# Patient Record
Sex: FEMALE | HISPANIC OR LATINO | Employment: UNEMPLOYED | URBAN - METROPOLITAN AREA
[De-identification: names, ages, dates, MRNs, and addresses within clinical notes are randomized per-mention and may not be internally consistent; named-entity substitution may affect disease eponyms.]

---

## 2018-01-24 ENCOUNTER — NON-PROVIDER VISIT (OUTPATIENT)
Dept: OCCUPATIONAL MEDICINE | Facility: CLINIC | Age: 53
End: 2018-01-24

## 2018-01-24 DIAGNOSIS — Z11.1 PPD SCREENING TEST: ICD-10-CM

## 2018-01-24 PROCEDURE — 86580 TB INTRADERMAL TEST: CPT | Performed by: PREVENTIVE MEDICINE

## 2018-01-26 ENCOUNTER — APPOINTMENT (OUTPATIENT)
Dept: RADIOLOGY | Facility: IMAGING CENTER | Age: 53
End: 2018-01-26
Attending: PREVENTIVE MEDICINE

## 2018-01-26 ENCOUNTER — EMPLOYEE HEALTH (OUTPATIENT)
Dept: OCCUPATIONAL MEDICINE | Facility: CLINIC | Age: 53
End: 2018-01-26

## 2018-01-26 ENCOUNTER — NON-PROVIDER VISIT (OUTPATIENT)
Dept: OCCUPATIONAL MEDICINE | Facility: CLINIC | Age: 53
End: 2018-01-26

## 2018-01-26 DIAGNOSIS — Z11.1 PPD SCREENING TEST: ICD-10-CM

## 2018-01-26 LAB — TB WHEAL 3D P 5 TU DIAM: NORMAL MM

## 2018-01-26 PROCEDURE — 8916 PR CLEARANCE ONLY: Performed by: PREVENTIVE MEDICINE

## 2018-01-26 PROCEDURE — 71045 X-RAY EXAM CHEST 1 VIEW: CPT | Mod: TC | Performed by: PREVENTIVE MEDICINE

## 2020-12-02 ENCOUNTER — HOSPITAL ENCOUNTER (OUTPATIENT)
Dept: LAB | Facility: MEDICAL CENTER | Age: 55
End: 2020-12-02
Attending: ANESTHESIOLOGY
Payer: COMMERCIAL

## 2020-12-02 PROCEDURE — U0003 INFECTIOUS AGENT DETECTION BY NUCLEIC ACID (DNA OR RNA); SEVERE ACUTE RESPIRATORY SYNDROME CORONAVIRUS 2 (SARS-COV-2) (CORONAVIRUS DISEASE [COVID-19]), AMPLIFIED PROBE TECHNIQUE, MAKING USE OF HIGH THROUGHPUT TECHNOLOGIES AS DESCRIBED BY CMS-2020-01-R: HCPCS

## 2020-12-02 PROCEDURE — C9803 HOPD COVID-19 SPEC COLLECT: HCPCS

## 2020-12-03 LAB — COVID ORDER STATUS COVID19: NORMAL

## 2020-12-04 LAB
SARS-COV-2 RNA RESP QL NAA+PROBE: NOTDETECTED
SPECIMEN SOURCE: NORMAL

## 2022-07-22 ENCOUNTER — PATIENT OUTREACH (OUTPATIENT)
Dept: SCHEDULING | Facility: IMAGING CENTER | Age: 57
End: 2022-07-22

## 2022-07-22 ENCOUNTER — APPOINTMENT (OUTPATIENT)
Dept: RADIOLOGY | Facility: MEDICAL CENTER | Age: 57
End: 2022-07-22
Attending: EMERGENCY MEDICINE

## 2022-07-22 ENCOUNTER — HOSPITAL ENCOUNTER (EMERGENCY)
Facility: MEDICAL CENTER | Age: 57
End: 2022-07-22
Attending: EMERGENCY MEDICINE

## 2022-07-22 VITALS
HEIGHT: 63 IN | OXYGEN SATURATION: 98 % | BODY MASS INDEX: 29.77 KG/M2 | DIASTOLIC BLOOD PRESSURE: 64 MMHG | SYSTOLIC BLOOD PRESSURE: 127 MMHG | RESPIRATION RATE: 18 BRPM | WEIGHT: 167.99 LBS | TEMPERATURE: 98 F | HEART RATE: 72 BPM

## 2022-07-22 DIAGNOSIS — N20.0 KIDNEY STONE: ICD-10-CM

## 2022-07-22 LAB
ALBUMIN SERPL BCP-MCNC: 4.5 G/DL (ref 3.2–4.9)
ALBUMIN/GLOB SERPL: 1.4 G/DL
ALP SERPL-CCNC: 110 U/L (ref 30–99)
ALT SERPL-CCNC: 54 U/L (ref 2–50)
ANION GAP SERPL CALC-SCNC: 13 MMOL/L (ref 7–16)
APPEARANCE UR: CLEAR
AST SERPL-CCNC: 54 U/L (ref 12–45)
BACTERIA #/AREA URNS HPF: NEGATIVE /HPF
BASOPHILS # BLD AUTO: 0.4 % (ref 0–1.8)
BASOPHILS # BLD: 0.04 K/UL (ref 0–0.12)
BILIRUB SERPL-MCNC: 0.5 MG/DL (ref 0.1–1.5)
BILIRUB UR QL STRIP.AUTO: NEGATIVE
BUN SERPL-MCNC: 10 MG/DL (ref 8–22)
CALCIUM SERPL-MCNC: 9.8 MG/DL (ref 8.5–10.5)
CHLORIDE SERPL-SCNC: 106 MMOL/L (ref 96–112)
CO2 SERPL-SCNC: 22 MMOL/L (ref 20–33)
COLOR UR: YELLOW
CREAT SERPL-MCNC: 0.64 MG/DL (ref 0.5–1.4)
EOSINOPHIL # BLD AUTO: 0.16 K/UL (ref 0–0.51)
EOSINOPHIL NFR BLD: 1.5 % (ref 0–6.9)
EPI CELLS #/AREA URNS HPF: ABNORMAL /HPF
ERYTHROCYTE [DISTWIDTH] IN BLOOD BY AUTOMATED COUNT: 39.1 FL (ref 35.9–50)
GFR SERPLBLD CREATININE-BSD FMLA CKD-EPI: 102 ML/MIN/1.73 M 2
GLOBULIN SER CALC-MCNC: 3.2 G/DL (ref 1.9–3.5)
GLUCOSE SERPL-MCNC: 140 MG/DL (ref 65–99)
GLUCOSE UR STRIP.AUTO-MCNC: NEGATIVE MG/DL
HCG UR QL: NEGATIVE
HCT VFR BLD AUTO: 44.2 % (ref 37–47)
HGB BLD-MCNC: 14.9 G/DL (ref 12–16)
IMM GRANULOCYTES # BLD AUTO: 0.03 K/UL (ref 0–0.11)
IMM GRANULOCYTES NFR BLD AUTO: 0.3 % (ref 0–0.9)
KETONES UR STRIP.AUTO-MCNC: ABNORMAL MG/DL
LEUKOCYTE ESTERASE UR QL STRIP.AUTO: ABNORMAL
LIPASE SERPL-CCNC: 45 U/L (ref 11–82)
LYMPHOCYTES # BLD AUTO: 3.45 K/UL (ref 1–4.8)
LYMPHOCYTES NFR BLD: 32.7 % (ref 22–41)
MCH RBC QN AUTO: 27.5 PG (ref 27–33)
MCHC RBC AUTO-ENTMCNC: 33.7 G/DL (ref 33.6–35)
MCV RBC AUTO: 81.7 FL (ref 81.4–97.8)
MICRO URNS: ABNORMAL
MONOCYTES # BLD AUTO: 0.7 K/UL (ref 0–0.85)
MONOCYTES NFR BLD AUTO: 6.6 % (ref 0–13.4)
NEUTROPHILS # BLD AUTO: 6.18 K/UL (ref 2–7.15)
NEUTROPHILS NFR BLD: 58.5 % (ref 44–72)
NITRITE UR QL STRIP.AUTO: NEGATIVE
NRBC # BLD AUTO: 0 K/UL
NRBC BLD-RTO: 0 /100 WBC
PH UR STRIP.AUTO: 7.5 [PH] (ref 5–8)
PLATELET # BLD AUTO: 306 K/UL (ref 164–446)
PMV BLD AUTO: 9.2 FL (ref 9–12.9)
POTASSIUM SERPL-SCNC: 4.4 MMOL/L (ref 3.6–5.5)
PROT SERPL-MCNC: 7.7 G/DL (ref 6–8.2)
PROT UR QL STRIP: 30 MG/DL
RBC # BLD AUTO: 5.41 M/UL (ref 4.2–5.4)
RBC # URNS HPF: ABNORMAL /HPF
RBC UR QL AUTO: ABNORMAL
SODIUM SERPL-SCNC: 141 MMOL/L (ref 135–145)
SP GR UR STRIP.AUTO: 1.03
UROBILINOGEN UR STRIP.AUTO-MCNC: 1 MG/DL
WBC # BLD AUTO: 10.6 K/UL (ref 4.8–10.8)
WBC #/AREA URNS HPF: ABNORMAL /HPF

## 2022-07-22 PROCEDURE — 74176 CT ABD & PELVIS W/O CONTRAST: CPT

## 2022-07-22 PROCEDURE — 96374 THER/PROPH/DIAG INJ IV PUSH: CPT

## 2022-07-22 PROCEDURE — 80053 COMPREHEN METABOLIC PANEL: CPT

## 2022-07-22 PROCEDURE — 700102 HCHG RX REV CODE 250 W/ 637 OVERRIDE(OP): Performed by: EMERGENCY MEDICINE

## 2022-07-22 PROCEDURE — 85025 COMPLETE CBC W/AUTO DIFF WBC: CPT

## 2022-07-22 PROCEDURE — 81001 URINALYSIS AUTO W/SCOPE: CPT

## 2022-07-22 PROCEDURE — 99284 EMERGENCY DEPT VISIT MOD MDM: CPT

## 2022-07-22 PROCEDURE — A9270 NON-COVERED ITEM OR SERVICE: HCPCS | Performed by: EMERGENCY MEDICINE

## 2022-07-22 PROCEDURE — 700111 HCHG RX REV CODE 636 W/ 250 OVERRIDE (IP): Performed by: EMERGENCY MEDICINE

## 2022-07-22 PROCEDURE — 83690 ASSAY OF LIPASE: CPT

## 2022-07-22 PROCEDURE — 81025 URINE PREGNANCY TEST: CPT

## 2022-07-22 PROCEDURE — 36415 COLL VENOUS BLD VENIPUNCTURE: CPT

## 2022-07-22 RX ORDER — ACETAMINOPHEN 325 MG/1
650 TABLET ORAL ONCE
Status: COMPLETED | OUTPATIENT
Start: 2022-07-22 | End: 2022-07-22

## 2022-07-22 RX ORDER — KETOROLAC TROMETHAMINE 30 MG/ML
15 INJECTION, SOLUTION INTRAMUSCULAR; INTRAVENOUS ONCE
Status: COMPLETED | OUTPATIENT
Start: 2022-07-22 | End: 2022-07-22

## 2022-07-22 RX ORDER — OXYCODONE HYDROCHLORIDE AND ACETAMINOPHEN 5; 325 MG/1; MG/1
1 TABLET ORAL ONCE
Status: COMPLETED | OUTPATIENT
Start: 2022-07-22 | End: 2022-07-22

## 2022-07-22 RX ADMIN — OXYCODONE HYDROCHLORIDE AND ACETAMINOPHEN 1 TABLET: 5; 325 TABLET ORAL at 05:27

## 2022-07-22 RX ADMIN — KETOROLAC TROMETHAMINE 15 MG: 30 INJECTION, SOLUTION INTRAMUSCULAR at 05:10

## 2022-07-22 RX ADMIN — ACETAMINOPHEN 650 MG: 325 TABLET, FILM COATED ORAL at 05:09

## 2022-07-22 ASSESSMENT — PAIN DESCRIPTION - DESCRIPTORS: DESCRIPTORS: STABBING

## 2022-07-22 NOTE — ED NOTES
Pt is discharged. Paperwork explained and all questions answered. All belongings sent with pt upon departure. Pt ambulatory with a steady gait out of ED.

## 2022-07-22 NOTE — DISCHARGE INSTRUCTIONS
You were seen in the ED for flank pain. Your labs and imaging suggest you have a kidney stone that has passed..  Your pain improved with the medication we gave you.  And should continue to improve.    You can take ibuprofen, 600mg every 6 hours as needed for pain (take with food to avoid GI upset). You can also take Tylenol (acetaminophen), 1000mg every 8 hours as needed for pain (do not take more than 3000mg of acetaminophen in 24 hours). Taking these medications regularly can be very effective in controlling pain.       Please stay well hydrated, drink at least 3-4 liters of water daily.  Please follow-up with urology.     Please return to the ED if you develop inability to pass urine, fevers, ongoing vomiting, or new or different pain.

## 2022-07-22 NOTE — ED PROVIDER NOTES
ED Provider Note    Scribed for Kaleb Conn M.D. by Thu Rankin. 7/22/2022,  4:11 AM.    Means of Arrival: Walk-in  History obtained from: Patient   History limited by: None     CHIEF COMPLAINT  Chief Complaint   Patient presents with   • Flank Pain     C/o right flank pain started 11 pm. Pain radiates to RLQ. Denies N/V.        HPI  Mt Haro is a 57 y.o. female who presents to the Emergency Department for evaluation of sudden right sided flank pain onset 11PM prior to arrival. Patient describes her pain quality as sharp. Patient states that it feels like she is giving birth. She states that her pain radiates to her right lower quadrant. Patient reports history of diabetes, thyroid problems, and cholesterol issues. She notes that she also has a family history of kidney stones, but she has never had one before. She reports that laying down exacerbates her symptoms. She admits to associated symptoms of diffuse abdominal pain, but denies vomiting, fevers, hematuria, or diarrhea. No alleviating factors were reported. She denies any allergies.     REVIEW OF SYSTEMS  CONSTITUTIONAL:  No fever.  CARDIOVASCULAR:  No chest discomfort.  RESPIRATORY:  No pleuritic chest pain.  GASTROINTESTINAL:    GENITOURINARY:   No dysuria.  MUSCULOSKELETAL:  No arthralgia.  SKIN:  No rash or suspicious lesions.  NEUROLOGIC:   No headache.  See HPI for further details.   All other systems are negative.     PAST MEDICAL HISTORY  Past Medical History:   Diagnosis Date   • Diabetes mellitus (HCC)     type 2   • High cholesterol        FAMILY HISTORY  History reviewed. No pertinent family history.    SOCIAL HISTORY   reports that she has never smoked. She has never used smokeless tobacco. She reports that she does not drink alcohol and does not use drugs.    SURGICAL HISTORY  History reviewed. No pertinent surgical history.    CURRENT MEDICATIONS  Home Medications     Reviewed by Jj Montano R.N. (Registered Nurse) on 07/22/22 at  "0316  Med List Status: Partial   Medication Last Dose Status        Patient Ric Taking any Medications                       ALLERGIES  No Known Allergies    PHYSICAL EXAM  VITAL SIGNS: BP (!) 160/97   Pulse 69   Temp 36.6 °C (97.9 °F) (Temporal)   Resp 20   Ht 1.6 m (5' 3\")   Wt 76.2 kg (167 lb 15.9 oz)   SpO2 99%   BMI 29.76 kg/m²    Gen: Alert, uncomfortable appearing  HEENT: ATNC  Eyes: PERRL, EOMI, normal conjunctiva.   Neck: trachea midline  Resp: no respiratory distress  CV: No JVD  Abd: non-distended, soft, nontender  : Mild right CVA tenderness  Ext: No deformities  Psych: normal mood  Neuro: speech fluent     DIAGNOSTIC STUDIES / PROCEDURES     LABS  Labs Reviewed   CBC WITH DIFFERENTIAL - Abnormal; Notable for the following components:       Result Value    RBC 5.41 (*)     All other components within normal limits   COMP METABOLIC PANEL - Abnormal; Notable for the following components:    Glucose 140 (*)     AST(SGOT) 54 (*)     ALT(SGPT) 54 (*)     Alkaline Phosphatase 110 (*)     All other components within normal limits   URINALYSIS - Abnormal; Notable for the following components:    Ketones Trace (*)     Protein 30 (*)     Leukocyte Esterase Trace (*)     Occult Blood Moderate (*)     All other components within normal limits   URINE MICROSCOPIC (W/UA) - Abnormal; Notable for the following components:    RBC 20-50 (*)     All other components within normal limits   LIPASE   HCG QUALITATIVE UR   ESTIMATED GFR     All labs reviewed by me.    RADIOLOGY  CT-RENAL COLIC EVALUATION(A/P W/O)   Final Result         1.  Mild right hydronephrosis and hydroureter without visualized obstructing stone, there is tiny stone dependently within the bladder likely representing recently passed right ureteral stone.   2.  Diverticulosis        The radiologist’s interpretation of all radiology studies have been reviewed by me.    COURSE & MEDICAL DECISION MAKING  Pertinent Labs & Imaging studies reviewed. " (See chart for details)    4:11 AM Patient seen and examined at bedside. Patient will be treated with Toradol 15mg injection and tylenol 650mg tablet for her symptoms. Discussed plan of care with patient. I informed them that labs and imaging will be ordered to evaluate symptoms. Patient is understanding and agreeable with plan.     Medical Decision Making:  Patient presents with sudden onset right flank pain that radiates around to the abdomen.  It does not go down the leg to suggest cauda equina syndrome or sciatica.  She has a benign abdominal examination.  She denies any history of kidney stones, will order CTU given this the most likely diagnosis.  Will order urinalysis to evaluate for possible infected stone, urinary tract infection.  The patient has not taking anything for pain medications, will provide ketorolac and acetaminophen for initial pain relief.    CTA demonstrates passed kidney stone.  No evidence of SANDRITA or infection.  Urinalysis consistent with kidney stone.  Patient treated with pain medications, advised that her pain should improve.    The patient was given return precautions, anticipatory guidance, and the opportunity to ask questions prior to discharge.         DISPOSITION:  Patient will be discharged home in stable condition.    FOLLOW UP:  Your regular doctor.  To establish a primary care provider within our system, please call [phone]          Kip Rojas M.D.  [address] [facility] [address] [phone]    Schedule an appointment as soon as possible for a visit   As needed    [facility], Emergency Dept  [address] [facility] [place] [address] [phone]    If symptoms worsen      FINAL IMPRESSION  1. Kidney stone            Thu HUSTON (Scrpaul), am scribing for, and in the presence of, Kaleb Conn M.D..    Electronically signed by: Thu Rankin (Kofi), 7/22/2022    Kaleb HUSTON M.D. personally performed the services described in  this documentation, as scribed by Thu Rankin in my presence, and it is both accurate and complete. C.     The note accurately reflects work and decisions made by me.  Kaleb Conn M.D.  7/22/2022  4:39 AM      This dictation was created using voice recognition software. The accuracy of the dictation is limited to the abilities of the software. I expect there may be some errors of grammar and possibly content. The nursing notes were reviewed and certain aspects of this information were incorporated into this note.

## 2022-07-22 NOTE — ED TRIAGE NOTES
Mt Haro  57 y.o.  female  Chief Complaint   Patient presents with   • Flank Pain     C/o right flank pain started 11 pm. Pain radiates to RLQ. Denies N/V.

## 2022-09-16 ENCOUNTER — HOSPITAL ENCOUNTER (OUTPATIENT)
Dept: RADIOLOGY | Facility: MEDICAL CENTER | Age: 57
End: 2022-09-16
Attending: STUDENT IN AN ORGANIZED HEALTH CARE EDUCATION/TRAINING PROGRAM

## 2022-09-16 DIAGNOSIS — R10.9 RIGHT FLANK PAIN: ICD-10-CM

## 2023-09-28 ENCOUNTER — HOSPITAL ENCOUNTER (EMERGENCY)
Facility: MEDICAL CENTER | Age: 58
End: 2023-09-28
Attending: EMERGENCY MEDICINE
Payer: COMMERCIAL

## 2023-09-28 VITALS
TEMPERATURE: 98.3 F | DIASTOLIC BLOOD PRESSURE: 81 MMHG | HEIGHT: 62 IN | RESPIRATION RATE: 21 BRPM | BODY MASS INDEX: 29.01 KG/M2 | HEART RATE: 79 BPM | WEIGHT: 157.63 LBS | OXYGEN SATURATION: 99 % | SYSTOLIC BLOOD PRESSURE: 166 MMHG

## 2023-09-28 DIAGNOSIS — L72.3 INFECTED SEBACEOUS CYST OF SKIN: ICD-10-CM

## 2023-09-28 DIAGNOSIS — L08.9 INFECTED SEBACEOUS CYST OF SKIN: ICD-10-CM

## 2023-09-28 PROCEDURE — 99282 EMERGENCY DEPT VISIT SF MDM: CPT

## 2023-09-28 PROCEDURE — 303977 HCHG I & D

## 2023-09-28 PROCEDURE — 700111 HCHG RX REV CODE 636 W/ 250 OVERRIDE (IP): Performed by: EMERGENCY MEDICINE

## 2023-09-28 RX ORDER — SULFAMETHOXAZOLE AND TRIMETHOPRIM 800; 160 MG/1; MG/1
1 TABLET ORAL 2 TIMES DAILY
Qty: 14 TABLET | Refills: 0 | Status: ACTIVE | OUTPATIENT
Start: 2023-09-28 | End: 2023-10-05

## 2023-09-28 RX ADMIN — LIDOCAINE HYDROCHLORIDE 5 ML: 10 INJECTION, SOLUTION EPIDURAL; INFILTRATION; INTRACAUDAL at 16:40

## 2023-09-28 ASSESSMENT — FIBROSIS 4 INDEX: FIB4 SCORE: 1.42

## 2023-09-28 NOTE — ED NOTES
Patient escorted from waiting lobby to assigned room. Patient escorted by ambulating to room with steady gait. Patient given call button for assistance. Chart up for ERP.

## 2023-09-28 NOTE — ED PROVIDER NOTES
"ED Provider Note    Primary care provider: Pcp Pt States None    CHIEF COMPLAINT  Chief Complaint   Patient presents with    Wound Check     Pt reports that she has a reoccurring abscess to left axilla.         HPI  Mt Haro is a 58 y.o. female who presents to the Emergency Department a slowly enlarging wound from the left axillary region.  The patient states she had one removed a few years ago but it came back in the exact same location.  Denies any fevers or chills, has not had any drainage.      External Record Review: Patient was in the [place] emergency department 2021 for left axillary abscess.    REVIEW OF SYSTEMS  See HPI.     PAST MEDICAL HISTORY   has a past medical history of Diabetes mellitus (HCC) and High cholesterol.    SURGICAL HISTORY  patient denies any surgical history    SOCIAL HISTORY  Social History     Tobacco Use    Smoking status: Never    Smokeless tobacco: Never   Vaping Use    Vaping Use: Never used   Substance Use Topics    Alcohol use: Never    Drug use: Never      Social History     Substance and Sexual Activity   Drug Use Never       FAMILY HISTORY  No family history on file.    CURRENT MEDICATIONS  Reviewed.  See Encounter Summary.     ALLERGIES  No Known Allergies    PHYSICAL EXAM  VITAL SIGNS: BP (!) 160/88   Pulse 61   Temp 36.8 °C (98.3 °F) (Temporal)   Resp 16   Ht 1.575 m (5' 2\")   Wt 71.5 kg (157 lb 10.1 oz)   SpO2 98%   BMI 28.83 kg/m²   Constitutional: Awake, alert in no apparent distress.  HENT: Normocephalic, Bilateral external ears normal. Nose normal.   Eyes: Conjunctiva normal, non-icteric, EOMI.    Thorax & Lungs: Easy unlabored respirations, Clear to ascultation bilaterally.  Cardiovascular: Regular rate, Regular rhythm, No murmurs, rubs or gallops. Bilateral pulses symmetrical.   Abdomen:  Soft, nontender, nondistended, normal active bowel sounds.   :    Skin: In the left axilla there is a 1 cm x 2 cm raised area of induration consistent with an " infected epidermal inclusion cyst.  Musculoskeletal:   No cyanosis, clubbing or edema. No leg asymmetry.   Neurologic: Alert, Grossly non-focal.   Psychiatric: Normal affect, Normal mood  Lymphatic:        Procedure note:  The area of induration was anesthetized using 1% lidocaine without epinephrine, following this I used an 11 blade, made a longitudinal incision, the drainage was consistent with purulence and caseous material, the caseous material was removed with forceps and direct pressure.  The wound was left open and dressed with a sterile gauze, this was tolerated well without any immediate complications.      COURSE & MEDICAL DECISION MAKING  Pertinent Labs & Imaging studies reviewed. (See chart for details)    COURSE & MEDICAL DECISION MAKING  Pertinent Labs & Imaging studies reviewed. (See chart for details)    Differential diagnoses include but are not limited to: Infected EIC    4:06 PM - Nursing notes reviewed, patient seen and examined at bedside.       Escalation of care considered, and ultimately not performed: blood analysis.    Barriers to care at this time, including but not limited to: Patient does not have established PCP.     Decision Making:  This is a pleasant 58 y.o. year old female who presents with what appears to be an infected epidermal inclusion cyst.  The cyst was drained as above, patient will be placed on antibiotics.  I explained that this will likely recur and like she gets complete excision of the area, typically done by a surgeon.  I will place a referral for a general surgeon, no emergent need for consultation, she can follow-up and had this removed in the next few months.       The patient was discharged home (see d/c instructions) was told to return immediately for any signs or symptoms listed, or any worsening at all.  The patient verbally agreed to the discharge precautions and follow-up plan which is documented in EPIC.    Discharge Medications:  New Prescriptions     SULFAMETHOXAZOLE-TRIMETHOPRIM (BACTRIM DS) 800-160 MG TABLET    Take 1 Tablet by mouth 2 times a day for 7 days.       FINAL IMPRESSION  1. Infected sebaceous cyst of skin

## 2023-09-28 NOTE — ED TRIAGE NOTES
"Chief Complaint   Patient presents with    Wound Check     Pt reports that she has a reoccurring abscess to left axilla.     BP (!) 160/88   Pulse 61   Temp 36.8 °C (98.3 °F) (Temporal)   Resp 16   Ht 1.575 m (5' 2\")   Wt 71.5 kg (157 lb 10.1 oz)   SpO2 98%   BMI 28.83 kg/m²     "

## 2023-09-29 NOTE — ED NOTES
Pt abscess/wound site on L axilla/ underarm from I&D, cleaned up, dressing applied per ERP. Adaptic w/ 4x4 gauze and taped to secure. Pt provided supplies for home w/ instructions on changing bandage and keeping site clean. Pt verbalizes understanding    Pt signed and given d/c papers. Discussed x1 rx sent to pref pharmacy and f/u-info highlighted. Discussed importance of taking and finishing full course of abx- pt verbalizes understanding. Pt denies further questions/concerns. Pt ambulated out of the dept w/ steady gait A&O x4 w/ all belongings

## 2023-12-06 ENCOUNTER — OFFICE VISIT (OUTPATIENT)
Dept: URGENT CARE | Facility: CLINIC | Age: 58
End: 2023-12-06
Payer: COMMERCIAL

## 2023-12-06 VITALS
BODY MASS INDEX: 30.07 KG/M2 | OXYGEN SATURATION: 95 % | HEART RATE: 78 BPM | HEIGHT: 62 IN | RESPIRATION RATE: 16 BRPM | SYSTOLIC BLOOD PRESSURE: 120 MMHG | TEMPERATURE: 97.7 F | DIASTOLIC BLOOD PRESSURE: 88 MMHG | WEIGHT: 163.4 LBS

## 2023-12-06 DIAGNOSIS — J01.90 ACUTE NON-RECURRENT SINUSITIS, UNSPECIFIED LOCATION: ICD-10-CM

## 2023-12-06 DIAGNOSIS — R05.1 ACUTE COUGH: ICD-10-CM

## 2023-12-06 PROCEDURE — 3074F SYST BP LT 130 MM HG: CPT | Performed by: PHYSICIAN ASSISTANT

## 2023-12-06 PROCEDURE — 3079F DIAST BP 80-89 MM HG: CPT | Performed by: PHYSICIAN ASSISTANT

## 2023-12-06 PROCEDURE — 99204 OFFICE O/P NEW MOD 45 MIN: CPT | Performed by: PHYSICIAN ASSISTANT

## 2023-12-06 RX ORDER — IBUPROFEN 600 MG/1
600 TABLET ORAL EVERY 6 HOURS PRN
COMMUNITY

## 2023-12-06 RX ORDER — DEXTROMETHORPHAN HYDROBROMIDE AND PROMETHAZINE HYDROCHLORIDE 15; 6.25 MG/5ML; MG/5ML
5 SYRUP ORAL 4 TIMES DAILY PRN
Qty: 118 ML | Refills: 0 | Status: SHIPPED | OUTPATIENT
Start: 2023-12-06 | End: 2024-03-06

## 2023-12-06 RX ORDER — FLUTICASONE PROPIONATE 50 MCG
1 SPRAY, SUSPENSION (ML) NASAL DAILY
Qty: 16 G | Refills: 0 | Status: SHIPPED | OUTPATIENT
Start: 2023-12-06 | End: 2024-03-06

## 2023-12-06 RX ORDER — AMOXICILLIN AND CLAVULANATE POTASSIUM 875; 125 MG/1; MG/1
1 TABLET, FILM COATED ORAL 2 TIMES DAILY
Qty: 14 TABLET | Refills: 0 | Status: SHIPPED | OUTPATIENT
Start: 2023-12-06 | End: 2023-12-13

## 2023-12-06 ASSESSMENT — ENCOUNTER SYMPTOMS
HEMOPTYSIS: 0
COUGH: 1
SHORTNESS OF BREATH: 0
HEADACHES: 1
CHILLS: 0
SORE THROAT: 1
RHINORRHEA: 1

## 2023-12-06 ASSESSMENT — FIBROSIS 4 INDEX: FIB4 SCORE: 1.42

## 2023-12-07 NOTE — PROGRESS NOTES
Subjective:   Mt Haro is a 58 y.o. female who presents for Cough (Persistent cough, chills, headache, body aches x 8 days)        Cough  This is a new problem. The current episode started 1 to 4 weeks ago. The problem has been gradually worsening. The problem occurs constantly. The cough is Productive of purulent sputum. Associated symptoms include headaches, nasal congestion (severe with sinus pressure. yellow nasal d/c.), rhinorrhea and a sore throat. Pertinent negatives include no chest pain, chills, ear pain, hemoptysis or shortness of breath. Treatments tried: theraflu, tylenol, honey, ginger. The treatment provided no relief. There is no history of asthma, bronchitis or pneumonia.     Review of Systems   Constitutional:  Negative for chills.   HENT:  Positive for rhinorrhea and sore throat. Negative for ear pain.    Respiratory:  Positive for cough. Negative for hemoptysis and shortness of breath.    Cardiovascular:  Negative for chest pain.   Neurological:  Positive for headaches.       PMH:  has a past medical history of Diabetes mellitus (HCC) and High cholesterol.  MEDS:   Current Outpatient Medications:     ibuprofen (MOTRIN) 600 MG Tab, Take 600 mg by mouth every 6 hours as needed., Disp: , Rfl:     amoxicillin-clavulanate (AUGMENTIN) 875-125 MG Tab, Take 1 Tablet by mouth 2 times a day for 7 days., Disp: 14 Tablet, Rfl: 0    promethazine-dextromethorphan (PROMETHAZINE-DM) 6.25-15 MG/5ML syrup, Take 5 mL by mouth 4 times a day as needed for Cough., Disp: 118 mL, Rfl: 0    fluticasone (FLONASE) 50 MCG/ACT nasal spray, Administer 1 Spray into affected nostril(S) every day., Disp: 16 g, Rfl: 0  ALLERGIES: No Known Allergies  SURGHX: History reviewed. No pertinent surgical history.  SOCHX:  reports that she has never smoked. She has never used smokeless tobacco. She reports that she does not drink alcohol and does not use drugs.  FH: Family history was reviewed, no pertinent findings to report    "Objective:   /88 (BP Location: Right arm, Patient Position: Sitting, BP Cuff Size: Adult)   Pulse 78   Temp 36.5 °C (97.7 °F) (Temporal)   Resp 16   Ht 1.575 m (5' 2\")   Wt 74.1 kg (163 lb 6.4 oz)   SpO2 95%   BMI 29.89 kg/m²   Physical Exam  Vitals reviewed.   Constitutional:       General: She is not in acute distress.     Appearance: Normal appearance. She is well-developed. She is not toxic-appearing.   HENT:      Head: Normocephalic and atraumatic.      Right Ear: External ear normal.      Left Ear: External ear normal.      Nose: Congestion and rhinorrhea present. Rhinorrhea is purulent.      Mouth/Throat:      Lips: Pink.      Mouth: Mucous membranes are moist.      Pharynx: Oropharynx is clear. Uvula midline. Posterior oropharyngeal erythema present.   Cardiovascular:      Rate and Rhythm: Normal rate and regular rhythm.      Heart sounds: Normal heart sounds, S1 normal and S2 normal.   Pulmonary:      Effort: Pulmonary effort is normal. No respiratory distress.      Breath sounds: Normal breath sounds. No stridor. No decreased breath sounds, wheezing, rhonchi or rales.      Comments: Frequent dry cough  Lymphadenopathy:      Cervical:      Right cervical: No superficial or posterior cervical adenopathy.     Left cervical: No superficial or posterior cervical adenopathy.   Skin:     General: Skin is dry.   Neurological:      Comments: Alert and oriented.    Psychiatric:         Speech: Speech normal.         Behavior: Behavior normal.           Assessment/Plan:   1. Acute non-recurrent sinusitis, unspecified location  - amoxicillin-clavulanate (AUGMENTIN) 875-125 MG Tab; Take 1 Tablet by mouth 2 times a day for 7 days.  Dispense: 14 Tablet; Refill: 0  - promethazine-dextromethorphan (PROMETHAZINE-DM) 6.25-15 MG/5ML syrup; Take 5 mL by mouth 4 times a day as needed for Cough.  Dispense: 118 mL; Refill: 0    2. Acute cough  - fluticasone (FLONASE) 50 MCG/ACT nasal spray; Administer 1 Spray into " affected nostril(S) every day.  Dispense: 16 g; Refill: 0    Other orders  - ibuprofen (MOTRIN) 600 MG Tab; Take 600 mg by mouth every 6 hours as needed.    Considerations include but not limited to sinusitis, allergic rhinitis, viral URI, bronchitis, pneumonia.  History and exam today consistent with sinusitis.  Given duration of progression of symptoms I suspect that this is bacterial in nature.    Patient started on antibiotic therapy.  Recommend taking these with food to avoid GI upset.  Concurrent use of probiotic may also be beneficial.    Flonase 1 squirt in each nostril, as instructed in clinic, once a day.  Use nasal saline TID to promote drainage.   Salt water gurgles to soothe sore throat.  Ayr saline gel to moisturize nasal passage and prevent bleeding.  Try to use sudafed sparingly in order to allow sinuses to drain.  Avoid the longer formulations and try to take only in the morning and/or at noon if needed.  If you fail to improve in 3-5 days or symptoms worsen/new symptoms develop, RTC for reevaluation

## 2024-03-06 ENCOUNTER — OFFICE VISIT (OUTPATIENT)
Dept: INTERNAL MEDICINE | Facility: OTHER | Age: 59
End: 2024-03-06
Payer: COMMERCIAL

## 2024-03-06 VITALS
BODY MASS INDEX: 30.29 KG/M2 | HEIGHT: 62 IN | OXYGEN SATURATION: 96 % | DIASTOLIC BLOOD PRESSURE: 80 MMHG | SYSTOLIC BLOOD PRESSURE: 130 MMHG | HEART RATE: 65 BPM | WEIGHT: 164.6 LBS | TEMPERATURE: 98.2 F

## 2024-03-06 DIAGNOSIS — E11.9 TYPE 2 DIABETES MELLITUS WITHOUT COMPLICATION, WITHOUT LONG-TERM CURRENT USE OF INSULIN (HCC): ICD-10-CM

## 2024-03-06 DIAGNOSIS — Z85.118 H/O: LUNG CANCER: ICD-10-CM

## 2024-03-06 DIAGNOSIS — E78.5 DYSLIPIDEMIA: ICD-10-CM

## 2024-03-06 DIAGNOSIS — R20.2 ARM PARESTHESIA, LEFT: ICD-10-CM

## 2024-03-06 DIAGNOSIS — E66.9 CLASS 1 OBESITY WITHOUT SERIOUS COMORBIDITY WITH BODY MASS INDEX (BMI) OF 30.0 TO 30.9 IN ADULT, UNSPECIFIED OBESITY TYPE: ICD-10-CM

## 2024-03-06 DIAGNOSIS — E03.9 HYPOTHYROIDISM, UNSPECIFIED TYPE: ICD-10-CM

## 2024-03-06 PROCEDURE — 99214 OFFICE O/P EST MOD 30 MIN: CPT | Mod: GC

## 2024-03-06 PROCEDURE — 3079F DIAST BP 80-89 MM HG: CPT | Mod: GC

## 2024-03-06 PROCEDURE — 3075F SYST BP GE 130 - 139MM HG: CPT | Mod: GC

## 2024-03-06 RX ORDER — LEVOTHYROXINE SODIUM 0.03 MG/1
25 TABLET ORAL ONCE
COMMUNITY
End: 2024-03-06 | Stop reason: SDUPTHER

## 2024-03-06 RX ORDER — ATORVASTATIN CALCIUM 20 MG/1
20 TABLET, FILM COATED ORAL NIGHTLY
Qty: 30 TABLET | Refills: 1 | Status: SHIPPED | OUTPATIENT
Start: 2024-03-06

## 2024-03-06 RX ORDER — LEVOTHYROXINE SODIUM 0.03 MG/1
25 TABLET ORAL ONCE
Qty: 1 TABLET | Refills: 0 | Status: SHIPPED | OUTPATIENT
Start: 2024-03-06 | End: 2024-03-06

## 2024-03-06 RX ORDER — ATORVASTATIN CALCIUM 20 MG/1
20 TABLET, FILM COATED ORAL NIGHTLY
COMMUNITY
End: 2024-03-06 | Stop reason: SDUPTHER

## 2024-03-06 ASSESSMENT — ENCOUNTER SYMPTOMS
NECK PAIN: 0
WEIGHT LOSS: 0
TINGLING: 1
SHORTNESS OF BREATH: 0
HEARTBURN: 0
DOUBLE VISION: 0
BACK PAIN: 0
CONSTIPATION: 0
BLOOD IN STOOL: 0
SENSORY CHANGE: 0
BLURRED VISION: 0
TREMORS: 0
SPEECH CHANGE: 0
DIAPHORESIS: 0
PALPITATIONS: 0
FOCAL WEAKNESS: 0
FALLS: 0
WEAKNESS: 0
ABDOMINAL PAIN: 0
COUGH: 0
MYALGIAS: 0
DIARRHEA: 0

## 2024-03-06 ASSESSMENT — FIBROSIS 4 INDEX: FIB4 SCORE: 1.42

## 2024-03-06 ASSESSMENT — PATIENT HEALTH QUESTIONNAIRE - PHQ9: CLINICAL INTERPRETATION OF PHQ2 SCORE: 0

## 2024-03-06 NOTE — ASSESSMENT & PLAN NOTE
Onset: 2 months.   Numbness and tingling involving left, anterior forearm downwards and also affecting distally finger 1-3. Denies weakness, fever, time sensitive ...  No h/o autoimmune disease in her family, including SLE, RA or others.   Positive Phalen and Tinel on left extremity. Reproducible electric type pain in left forearm upon medial palpation of left elbow. None of this findings present in right hand, wrist, forearm but concern for development in the future.   Concern for Carpal Tunnel syndrome and possible ulnar nerve entrapment at the elbow.     Plan:  - Referral to [facility].   - Wrist brace, patient already has one at home, advised to use.

## 2024-03-06 NOTE — PROGRESS NOTES
OFFICE VISIT: INITIAL ENCOUNTER    Mt Haro is a 58 y.o. female with PMH of NIDDM type 2, Dyslipidemia, Hypothyroidism, prior Lung cancer (s/p resection in 2010 w/o chemo or radiation) who presents today with the following:    Chief Complaint:  Establish care.     History of Present Illness:   Bilateral wrist and elbow pain.  Patient refers that his problem presented itself approximately 2 months ago.  It affects more her left wrist and elbow.  She refers she has a sensation of having her wrist and fingers swollen throughout the day as well as numbness/tingling and electric type pain from her elbow downwards on the left side that it stops at the wrist and the same numbness/tingling sensation involving the distal aspects of fingers 1 through 3.  On the right side she does not have this paresthesias.  She denies prior history of RA, SLE in her or any family member, or any other specific autoimmune diseases run in her family in this regard.  She denies any traumas to the affected areas; she works in house keeping at the [employer] which could potentially be the main inciting factor.  The a nice loss of strength, localized swelling, changes in skin coloration or rashes, fevers, visual changes or other specific symptoms.  Given evaluation we have discussed with patient that we are considering about potential nerve compression at the level of the elbow as well adds to risk causing ulnar nerve entrapment and carpal tunnel syndrome, respectively.  Patient is agreeable for EMGs a referral to her neurodiagnostics has been placed.  She already has a wrist brace at home which we have encouraged her to use.  Need for medication refill. Placed refills for Metformin, Atorvastatin and Levothyroxine.   Need for records.  Patient has had 2 different PCPs in the past within [facility].  Patient agreeable and has signed for us to request medical records from this 2 different institutions to assess prior management  and workup results regarding her chronic conditions.    Preventative health care.   Colon cancer screening: Colonoscopy 2023 per patient, normal.   HCV, Hepatitis screening: Negative per patient.   Mammogram: 12/2023, BIRADS-2 > next in 12/2025.   Cervical cancer screening: Unclear, records pending.   Flu vaccine: 10/2023.   COVID booster: Prefers not to.   Tdap: 2019    Review of Systems   Constitutional:  Negative for diaphoresis, malaise/fatigue and weight loss.   HENT:  Negative for hearing loss.    Eyes:  Negative for blurred vision and double vision.   Respiratory:  Negative for cough and shortness of breath.    Cardiovascular:  Negative for chest pain and palpitations.   Gastrointestinal:  Negative for abdominal pain, blood in stool, constipation, diarrhea, heartburn and melena.   Genitourinary:  Negative for dysuria, frequency and hematuria.   Musculoskeletal:  Positive for joint pain (Wrists, elbows, more left.). Negative for back pain, falls, myalgias and neck pain.   Skin:  Negative for rash.   Neurological:  Positive for tingling. Negative for tremors, sensory change, speech change, focal weakness and weakness.        Past Medical History:   Past Medical History:   Diagnosis Date    Diabetes mellitus (HCC)     type 2    High cholesterol     Hypothyroidism     Lung cancer (HCC)     2010. Surgical resection; no chemo or radiation therapy.       Patient Active Problem List    Diagnosis Date Noted    Type 2 diabetes mellitus without complication, without long-term current use of insulin (HCC) 03/06/2024    Dyslipidemia 03/06/2024    Hypothyroidism 03/06/2024    Class 1 obesity without serious comorbidity with body mass index (BMI) of 30.0 to 30.9 in adult 03/06/2024    H/O: lung cancer 03/06/2024    Arm paresthesia, left 03/06/2024       Past Surgical History:   Procedure Laterality Date    CHOLECYSTECTOMY      HYSTERECTOMY, VAGINAL      With salpigo oophorectomy.    MENISCUS REPAIR Right      "    Allergies:  Patient has no known allergies.    Medications:     Current Outpatient Medications:     metFORMIN, 500 mg, Oral, BID WITH MEALS    levothyroxine, 25 mcg, Oral, Once    atorvastatin, 20 mg, Oral, Nightly    ibuprofen, 600 mg, Oral, Q6HRS PRN, PRN     Social History:   Social History     Tobacco Use    Smoking status: Never    Smokeless tobacco: Never   Vaping Use    Vaping Use: Never used   Substance Use Topics    Alcohol use: Never    Drug use: Never       Family History:   Family History   Problem Relation Age of Onset    Diabetes Mother     Colon Cancer Mother     Kidney Disease Sister         Renal cysts    Hypertension Sister     Kidney Disease Brother         S/p renal transplant    Hypertension Brother     Kidney Disease Son     Kidney Disease Niece         Renal cysts    Hypertension Niece        Vitals:   /80 (BP Location: Right arm, Patient Position: Sitting, BP Cuff Size: Adult)   Pulse 65   Temp 36.8 °C (98.2 °F) (Temporal)   Ht 1.575 m (5' 2\")   Wt 74.7 kg (164 lb 9.6 oz)   SpO2 96%  Body mass index is 30.11 kg/m².    Physical Exam  Constitutional:       General: She is not in acute distress.     Appearance: She is obese. She is not ill-appearing.   HENT:      Nose: Nose normal.      Mouth/Throat:      Mouth: Mucous membranes are moist.      Pharynx: Oropharynx is clear.   Eyes:      Extraocular Movements: Extraocular movements intact.      Conjunctiva/sclera: Conjunctivae normal.      Pupils: Pupils are equal, round, and reactive to light.   Cardiovascular:      Rate and Rhythm: Normal rate and regular rhythm.      Pulses: Normal pulses.      Heart sounds: Normal heart sounds.   Pulmonary:      Effort: Pulmonary effort is normal. No respiratory distress.      Breath sounds: Normal breath sounds.   Chest:      Chest wall: No tenderness.   Abdominal:      General: Bowel sounds are normal. There is no distension.      Palpations: Abdomen is soft.      Tenderness: There is no " abdominal tenderness. There is no right CVA tenderness or left CVA tenderness.   Musculoskeletal:         General: Tenderness (Both forearms, more left.) present. No swelling, deformity or signs of injury.      Cervical back: No tenderness.      Right lower leg: No edema.      Left lower leg: No edema.   Lymphadenopathy:      Cervical: No cervical adenopathy.   Skin:     Coloration: Skin is not jaundiced or pale.      Findings: No rash.   Neurological:      Comments: Alert and oriented to person, time, and place  Follows commands  Speech is fluent  Pupils reactive to light and accomodation  Occular movements preserved  Facial symmetry preserved  Able to tolerate antigravity bilaterally, upper and lower extremities  No pronator drift  Sensation preserved throughout   DTR preserved, 2+ throughout  Phalen and Tinel positive for left side.           Assessment and Plan  58 y.o. female with PMH of NIDDM type 2, Dyslipidemia, Hypothyroidism, prior Lung cancer (s/p resection in 2010 w/o chemo or radiation).     Type 2 diabetes mellitus without complication, without long-term current use of insulin (HCC)  Last A1c: 6.1% (07/2021)  Last Microalb/cret ratio: None on file. Ordered records from prior PCP and new lab.   Last retinal scan: Normal in Jan, 2024 per patient.   Monofilament test: Pending.   Treatment regimen: Metformin 500 mg BID.   No reported diarrhea/constipation, abdominal pain, nausea/vomiting, polydipsia, polyphagia, polyuria, involuntary changes in weight.     Plan:  - Continue current treatment.   - A1c, Microalb/creat ratio.     Dyslipidemia  Lipid panel (07/2021): Chold 124, HDL 41, Trigl 130, 57.   ASCVD-10 score: 4.9% (based on above documented lipid panel).   Statin: Atorvastatin 20 mg daily (moderate intensity).   Diet and lifestyle changes discussed with patient. Open to nutrition referral.     Plan:  - Continue current Atorvastatin dose.   - Lipid panel, CMP.   - Nutrition referral.      Hypothyroidism  Last TSH: 0.950 (07/2020).   Treatment: Levothyroxine 25 mcg daily.  Compliant w/treatment.     Plan:  - Continue current dose of Levothyroxine.   - TSH + Reflex free T4.     Class 1 obesity without serious comorbidity with body mass index (BMI) of 30.0 to 30.9 in adult  Weight: 164 lbs.   BMI: 30.11.   Interested in Nutrition referral     H/O: lung cancer  Per patient, had lung cancer in 2010 treated with surgical resection and no chemo or radiation therapy, and confirmed by biopsy.   No follow-ups with Oncology or prior PCP for at least 4 years per patient.   No specific respiratory or systemic symptoms at this time concerning for recurrence or metastatic disease.     Plan:  - Low dose CT of chest.     Arm paresthesia, left  Onset: 2 months.   Numbness and tingling involving left, anterior forearm downwards and also affecting distally finger 1-3. Denies weakness, fever, time sensitive ...  No h/o autoimmune disease in her family, including SLE, RA or others.   Positive Phalen and Tinel on left extremity. Reproducible electric type pain in left forearm upon medial palpation of left elbow. None of this findings present in right hand, wrist, forearm but concern for development in the future.   Concern for Carpal Tunnel syndrome and possible ulnar nerve entrapment at the elbow.     Plan:  - Referral to Neurodiagnostics.   - Wrist brace, patient already has one at home, advised to use.         Orders Placed This Encounter    CT-CHEST LOW DOSE W/O    Lipid Profile    HEMOGLOBIN A1C    Comp Metabolic Panel    TSH WITH REFLEX TO FT4    MICROALBUMIN CREAT RATIO URINE    Referral to Nutrition Services    Referral to Neurodiagnostics (EEG,EP,EMG/NCS/DBS)    DISCONTD: metFORMIN (GLUCOPHAGE) 500 MG Tab    DISCONTD: atorvastatin (LIPITOR) 20 MG Tab    DISCONTD: levothyroxine (SYNTHROID) 25 MCG Tab    metFORMIN (GLUCOPHAGE) 500 MG Tab    levothyroxine (SYNTHROID) 25 MCG Tab    atorvastatin (LIPITOR) 20 MG Tab        Return in about 6 weeks (around 4/17/2024).      Please note that this dictation was created using voice recognition software. I have made every reasonable attempt to correct obvious errors, but I expect that there are errors of grammar and possibly content that I did not discover before finalizing the note.    Patient case was seen/ assessed/ discussed with Dr. Tidwell.     Signed by:    Kameron Diamond M.D.  PGY-2 Internal Medicine Resident

## 2024-03-06 NOTE — ASSESSMENT & PLAN NOTE
Lipid panel (07/2021): Chold 124, HDL 41, Trigl 130, 57.   ASCVD-10 score: 4.9% (based on above documented lipid panel).   Statin: Atorvastatin 20 mg daily (moderate intensity).   Diet and lifestyle changes discussed with patient. Open to nutrition referral.     Plan:  - Continue current Atorvastatin dose.   - Lipid panel, CMP.   - Nutrition referral.

## 2024-03-06 NOTE — PATIENT INSTRUCTIONS
Joseline por hernandez visita el dionne de hoy!    Por favor complete cheikh examenes de laboratorio antes de la siguiente lore.   Por favor agende lore para el CT Scan de los pulmones.   Por favor agende lore con [facility]. Ambas consultas cole sido enviadas y rohit vez aprobadas sera notificada para que pueda llamar a agendar la lore.   Lore de seguimiento en 6 semanas.     -------------------  Thank you for visiting us today!    Please complete lab work prior to next appointment.   Please schedule appointment for CT scan of chest.   Please schedule appointment with Nutrition and Neurodiagnostics. Referral has been sent, once approved you will be notified via My Chart with the information for you to call and coordinate.   Follow-up in 6 weeks.

## 2024-03-06 NOTE — LETTER
[facility]  Kameron Hernandez M.D.  [address] [place] [address]  Fax: [phone]   Authorization for Release/Disclosure of   Protected Health Information   Name: MT MAO : 1965 SSN: [id number]   Address: [facility] [place] [address] Phone:    [phone] (home)    I authorize the entity listed below to release/disclose the PHI below to:   [facility]/Kameron Hernandez M.D. and Kameron Hernandez M.D.   Provider or Entity Name:     Address   City, State, Zip   Phone:      Fax:     Reason for request: continuity of care   Information to be released:    [  ] LAST COLONOSCOPY,  including any PATH REPORT and follow-up  [  ] LAST FIT/COLOGUARD RESULT [  ] LAST DEXA  [  ] LAST MAMMOGRAM  [  ] LAST PAP  [  ] LAST LABS [  ] RETINA EXAM REPORT  [  ] IMMUNIZATION RECORDS  [  ] Release all info      [  ] Check here and initial the line next to each item to release ALL health information INCLUDING  _____ Care and treatment for drug and / or alcohol abuse  _____ HIV testing, infection status, or AIDS  _____ Genetic Testing    DATES OF SERVICE OR TIME PERIOD TO BE DISCLOSED: _____________  I understand and acknowledge that:  * This Authorization may be revoked at any time by you in writing, except if your health information has already been used or disclosed.  * Your health information that will be used or disclosed as a result of you signing this authorization could be re-disclosed by the recipient. If this occurs, your re-disclosed health information may no longer be protected by State or Federal laws.  * You may refuse to sign this Authorization. Your refusal will not affect your ability to obtain treatment.  * This Authorization becomes effective upon signing and will  on (date) __________.      If no date is indicated, this Authorization will  one (1) year from the signature date.    Name: Mt Mao  Signature: Date:   3/6/2024     PLEASE FAX REQUESTED RECORDS BACK TO:  [phone]

## 2024-03-06 NOTE — ASSESSMENT & PLAN NOTE
Per patient, had lung cancer in 2010 treated with surgical resection and no chemo or radiation therapy, and confirmed by biopsy.   No follow-ups with Oncology or prior PCP for at least 4 years per patient.   No specific respiratory or systemic symptoms at this time concerning for recurrence or metastatic disease.     Plan:  - Low dose CT of chest.

## 2024-03-06 NOTE — ASSESSMENT & PLAN NOTE
Last A1c: 6.1% (07/2021)  Last Microalb/cret ratio: None on file. Ordered records from prior PCP and new lab.   Last retinal scan: Normal in Jan, 2024 per patient.   Monofilament test: Pending.   Treatment regimen: Metformin 500 mg BID.   No reported diarrhea/constipation, abdominal pain, nausea/vomiting, polydipsia, polyphagia, polyuria, involuntary changes in weight.     Plan:  - Continue current treatment.   - A1c, Microalb/creat ratio.

## 2024-03-06 NOTE — ASSESSMENT & PLAN NOTE
Last TSH: 0.950 (07/2020).   Treatment: Levothyroxine 25 mcg daily.  Compliant w/treatment.     Plan:  - Continue current dose of Levothyroxine.   - TSH + Reflex free T4.

## 2024-03-25 ENCOUNTER — HOSPITAL ENCOUNTER (OUTPATIENT)
Dept: LAB | Facility: MEDICAL CENTER | Age: 59
End: 2024-03-25
Payer: COMMERCIAL

## 2024-03-25 DIAGNOSIS — E78.5 DYSLIPIDEMIA: ICD-10-CM

## 2024-03-25 DIAGNOSIS — E11.9 TYPE 2 DIABETES MELLITUS WITHOUT COMPLICATION, WITHOUT LONG-TERM CURRENT USE OF INSULIN (HCC): ICD-10-CM

## 2024-03-25 DIAGNOSIS — E03.9 HYPOTHYROIDISM, UNSPECIFIED TYPE: ICD-10-CM

## 2024-03-25 LAB
ALBUMIN SERPL BCP-MCNC: 4.5 G/DL (ref 3.2–4.9)
ALBUMIN/GLOB SERPL: 1.6 G/DL
ALP SERPL-CCNC: 94 U/L (ref 30–99)
ALT SERPL-CCNC: 14 U/L (ref 2–50)
ANION GAP SERPL CALC-SCNC: 13 MMOL/L (ref 7–16)
AST SERPL-CCNC: 20 U/L (ref 12–45)
BILIRUB SERPL-MCNC: 0.5 MG/DL (ref 0.1–1.5)
BUN SERPL-MCNC: 10 MG/DL (ref 8–22)
CALCIUM ALBUM COR SERPL-MCNC: 9.1 MG/DL (ref 8.5–10.5)
CALCIUM SERPL-MCNC: 9.5 MG/DL (ref 8.5–10.5)
CHLORIDE SERPL-SCNC: 103 MMOL/L (ref 96–112)
CHOLEST SERPL-MCNC: 135 MG/DL (ref 100–199)
CO2 SERPL-SCNC: 24 MMOL/L (ref 20–33)
CREAT SERPL-MCNC: 0.65 MG/DL (ref 0.5–1.4)
CREAT UR-MCNC: 203.17 MG/DL
EST. AVERAGE GLUCOSE BLD GHB EST-MCNC: 128 MG/DL
GFR SERPLBLD CREATININE-BSD FMLA CKD-EPI: 101 ML/MIN/1.73 M 2
GLOBULIN SER CALC-MCNC: 2.8 G/DL (ref 1.9–3.5)
GLUCOSE SERPL-MCNC: 94 MG/DL (ref 65–99)
HBA1C MFR BLD: 6.1 % (ref 4–5.6)
HDLC SERPL-MCNC: 41 MG/DL
LDLC SERPL CALC-MCNC: 62 MG/DL
MICROALBUMIN UR-MCNC: <1.2 MG/DL
MICROALBUMIN/CREAT UR: NORMAL MG/G (ref 0–30)
POTASSIUM SERPL-SCNC: 4.2 MMOL/L (ref 3.6–5.5)
PROT SERPL-MCNC: 7.3 G/DL (ref 6–8.2)
SODIUM SERPL-SCNC: 140 MMOL/L (ref 135–145)
T4 FREE SERPL-MCNC: 1.04 NG/DL (ref 0.93–1.7)
TRIGL SERPL-MCNC: 159 MG/DL (ref 0–149)
TSH SERPL DL<=0.005 MIU/L-ACNC: 10.3 UIU/ML (ref 0.38–5.33)

## 2024-03-25 PROCEDURE — 84443 ASSAY THYROID STIM HORMONE: CPT

## 2024-03-25 PROCEDURE — 80061 LIPID PANEL: CPT

## 2024-03-25 PROCEDURE — 83036 HEMOGLOBIN GLYCOSYLATED A1C: CPT

## 2024-03-25 PROCEDURE — 82570 ASSAY OF URINE CREATININE: CPT

## 2024-03-25 PROCEDURE — 36415 COLL VENOUS BLD VENIPUNCTURE: CPT

## 2024-03-25 PROCEDURE — 80053 COMPREHEN METABOLIC PANEL: CPT

## 2024-03-25 PROCEDURE — 82043 UR ALBUMIN QUANTITATIVE: CPT

## 2024-03-25 PROCEDURE — 84439 ASSAY OF FREE THYROXINE: CPT
